# Patient Record
Sex: FEMALE | Race: WHITE | NOT HISPANIC OR LATINO
[De-identification: names, ages, dates, MRNs, and addresses within clinical notes are randomized per-mention and may not be internally consistent; named-entity substitution may affect disease eponyms.]

---

## 2020-12-10 PROBLEM — Z00.00 ENCOUNTER FOR PREVENTIVE HEALTH EXAMINATION: Status: ACTIVE | Noted: 2020-12-10

## 2021-01-15 ENCOUNTER — APPOINTMENT (OUTPATIENT)
Dept: OBGYN | Facility: CLINIC | Age: 36
End: 2021-01-15

## 2021-07-13 ENCOUNTER — NON-APPOINTMENT (OUTPATIENT)
Age: 36
End: 2021-07-13

## 2021-07-13 ENCOUNTER — TRANSCRIPTION ENCOUNTER (OUTPATIENT)
Age: 36
End: 2021-07-13

## 2021-07-14 ENCOUNTER — NON-APPOINTMENT (OUTPATIENT)
Age: 36
End: 2021-07-14

## 2021-07-15 ENCOUNTER — LABORATORY RESULT (OUTPATIENT)
Age: 36
End: 2021-07-15

## 2021-07-15 ENCOUNTER — APPOINTMENT (OUTPATIENT)
Dept: OBGYN | Facility: CLINIC | Age: 36
End: 2021-07-15
Payer: MEDICAID

## 2021-07-15 VITALS
TEMPERATURE: 98.4 F | SYSTOLIC BLOOD PRESSURE: 120 MMHG | WEIGHT: 174 LBS | BODY MASS INDEX: 32.02 KG/M2 | HEIGHT: 62 IN | DIASTOLIC BLOOD PRESSURE: 70 MMHG

## 2021-07-15 DIAGNOSIS — Z01.419 ENCOUNTER FOR GYNECOLOGICAL EXAMINATION (GENERAL) (ROUTINE) W/OUT ABNORMAL FINDINGS: ICD-10-CM

## 2021-07-15 DIAGNOSIS — Z11.3 ENCOUNTER FOR SCREENING FOR INFECTIONS WITH A PREDOMINANTLY SEXUAL MODE OF TRANSMISSION: ICD-10-CM

## 2021-07-15 DIAGNOSIS — E28.2 POLYCYSTIC OVARIAN SYNDROME: ICD-10-CM

## 2021-07-15 PROCEDURE — 36415 COLL VENOUS BLD VENIPUNCTURE: CPT

## 2021-07-15 PROCEDURE — 99385 PREV VISIT NEW AGE 18-39: CPT

## 2021-07-15 NOTE — HISTORY OF PRESENT ILLNESS
[TextBox_4] : 35 year old G0 presents to establish care and for annual exam. Has been diagnosed with PCOS in ~2014 and previously was on OCPs to regulate periods. She went off of OCPs and wants to conceive. She and her fiance have been having unprotected sex for the last year without success, though notes that periods are very irregular. She gets maybe six periods per year. \par She reports a history of cervical dysplasia and underwent cold knife cone biopsy in 2014. Subsequent screenings have been normal. 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

## 2021-07-28 ENCOUNTER — TRANSCRIPTION ENCOUNTER (OUTPATIENT)
Age: 36
End: 2021-07-28

## 2021-07-28 LAB
17OHP SERPL-MCNC: 57 NG/DL
ALBUMIN SERPL ELPH-MCNC: 4.7 G/DL
ALP BLD-CCNC: 87 U/L
ALT SERPL-CCNC: 60 U/L
ANION GAP SERPL CALC-SCNC: 13 MMOL/L
AST SERPL-CCNC: 41 U/L
BILIRUB SERPL-MCNC: 0.4 MG/DL
BUN SERPL-MCNC: 15 MG/DL
C TRACH RRNA SPEC QL NAA+PROBE: NOT DETECTED
CALCIUM SERPL-MCNC: 10.1 MG/DL
CHLORIDE SERPL-SCNC: 99 MMOL/L
CO2 SERPL-SCNC: 24 MMOL/L
CREAT SERPL-MCNC: 0.9 MG/DL
CYTOLOGY CVX/VAG DOC THIN PREP: NORMAL
FSH SERPL-MCNC: 5.8 IU/L
GLUCOSE SERPL-MCNC: 99 MG/DL
HPV HIGH+LOW RISK DNA PNL CVX: NOT DETECTED
LH SERPL-ACNC: 14.4 IU/L
N GONORRHOEA RRNA SPEC QL NAA+PROBE: NOT DETECTED
POTASSIUM SERPL-SCNC: 4.5 MMOL/L
PROT SERPL-MCNC: 7.5 G/DL
SODIUM SERPL-SCNC: 136 MMOL/L
SOURCE TP AMPLIFICATION: NORMAL
T4 FREE SERPL-MCNC: 1 NG/DL
TESTOST BND SERPL-MCNC: 2.9 PG/ML
TESTOSTERONE TOTAL S: 41 NG/DL
TSH SERPL-ACNC: 1.94 UIU/ML

## 2021-07-30 ENCOUNTER — APPOINTMENT (OUTPATIENT)
Dept: OBGYN | Facility: CLINIC | Age: 36
End: 2021-07-30
Payer: MEDICAID

## 2021-07-30 DIAGNOSIS — R74.8 ABNORMAL LEVELS OF OTHER SERUM ENZYMES: ICD-10-CM

## 2021-07-30 PROCEDURE — 99211 OFF/OP EST MAY X REQ PHY/QHP: CPT

## 2021-08-03 LAB
ALBUMIN SERPL ELPH-MCNC: 4.7 G/DL
ALP BLD-CCNC: 83 U/L
ALT SERPL-CCNC: 46 U/L
ANION GAP SERPL CALC-SCNC: 13 MMOL/L
ANION GAP SERPL CALC-SCNC: 16 MMOL/L
AST SERPL-CCNC: 32 U/L
BILIRUB SERPL-MCNC: 0.3 MG/DL
BUN SERPL-MCNC: 11 MG/DL
BUN SERPL-MCNC: 12 MG/DL
CALCIUM SERPL-MCNC: 10.1 MG/DL
CALCIUM SERPL-MCNC: 9.7 MG/DL
CHLORIDE SERPL-SCNC: 100 MMOL/L
CHLORIDE SERPL-SCNC: 102 MMOL/L
CO2 SERPL-SCNC: 21 MMOL/L
CO2 SERPL-SCNC: 25 MMOL/L
CREAT SERPL-MCNC: 0.83 MG/DL
CREAT SERPL-MCNC: 0.85 MG/DL
GLUCOSE SERPL-MCNC: 105 MG/DL
GLUCOSE SERPL-MCNC: 111 MG/DL
POTASSIUM SERPL-SCNC: 4.8 MMOL/L
POTASSIUM SERPL-SCNC: 4.8 MMOL/L
PROT SERPL-MCNC: 7.7 G/DL
SODIUM SERPL-SCNC: 138 MMOL/L
SODIUM SERPL-SCNC: 140 MMOL/L

## 2021-08-10 ENCOUNTER — APPOINTMENT (OUTPATIENT)
Dept: OBGYN | Facility: CLINIC | Age: 36
End: 2021-08-10
Payer: MEDICAID

## 2021-08-10 VITALS
DIASTOLIC BLOOD PRESSURE: 70 MMHG | WEIGHT: 173 LBS | BODY MASS INDEX: 31.83 KG/M2 | HEIGHT: 62 IN | SYSTOLIC BLOOD PRESSURE: 104 MMHG

## 2021-08-10 DIAGNOSIS — N97.9 FEMALE INFERTILITY, UNSPECIFIED: ICD-10-CM

## 2021-08-10 PROCEDURE — 99214 OFFICE O/P EST MOD 30 MIN: CPT

## 2021-08-10 NOTE — HISTORY OF PRESENT ILLNESS
[FreeTextEntry1] : 35 year old G0 presents to f/u fertility evaluation. She has a history of PCOS and is s/p her serologic workup and use of ovulation predictor kit, which confirms anovulation. \par \par She reports that her fiance has not impregnated anyone else, and has not had semen analysis done. \par \par She is not aware of any structural abnormalities to the tubes and uterus, however this has not been evaluated. \par \par Labs from previous visit were reviewed. \par \par Overall she is feeling well but feels like her period is about to come. She has not seen her period since June and feels uncomfortable; would like to provoke her period if possible. \par \par

## 2021-08-10 NOTE — PLAN
[FreeTextEntry1] : We discussed fertility evaluation in detail. The patient will likely need ovulation induction. \par \par She was given information regarding semen analysis and explained its importance. \par \par We also discussed evaluation of structural abnormalities, and rx for TVUS and HSG were provided. \par \par Finally, we discussed ovulation induction with letrozole vs. clomid vs. metformin. After discussion of these options, the patient will opt for letrozole pending the above studies come back normal. We discussed the potential for increasing the dose progressively. Also discussed the option of ovulation induction with IUI under the care of an [facility] specialist; the patient wishes to hold off on specialist referral at this time. \par \par Will rx provera to provoke withdrawal bleed.

## 2021-08-19 LAB — HCG SERPL-MCNC: <1 MIU/ML

## 2022-08-09 ENCOUNTER — APPOINTMENT (OUTPATIENT)
Dept: GASTROENTEROLOGY | Facility: CLINIC | Age: 37
End: 2022-08-09

## 2022-08-09 VITALS
HEART RATE: 85 BPM | TEMPERATURE: 97 F | WEIGHT: 173 LBS | BODY MASS INDEX: 31.83 KG/M2 | HEIGHT: 62 IN | OXYGEN SATURATION: 98 % | SYSTOLIC BLOOD PRESSURE: 120 MMHG | DIASTOLIC BLOOD PRESSURE: 80 MMHG

## 2022-08-09 DIAGNOSIS — K21.9 GASTRO-ESOPHAGEAL REFLUX DISEASE W/OUT ESOPHAGITIS: ICD-10-CM

## 2022-08-09 PROCEDURE — 99203 OFFICE O/P NEW LOW 30 MIN: CPT

## 2022-08-09 RX ORDER — MEDROXYPROGESTERONE ACETATE 10 MG/1
10 TABLET ORAL DAILY
Qty: 10 | Refills: 6 | Status: DISCONTINUED | COMMUNITY
Start: 2021-08-10 | End: 2022-08-09

## 2022-08-09 NOTE — ASSESSMENT
[FreeTextEntry1] : Will start omeprazole 20mg daily.\par \par Can use gaviscon OTC for immediate relief.\par \par No alarm signs or symptoms to necessitate an endoscopic workup at this time.

## 2022-08-09 NOTE — HISTORY OF PRESENT ILLNESS
[FreeTextEntry1] : Ms. Smith is a pleasant 36F h/o PCOS who presents to establish care.\par \par She has had significant heartburn and occasional regurgitation for the past 2 years. She will frequently have heartburn and a sour taste in the back of her mouth, she has had regurgitation with vomiting several times.  No dysphagia, odynophagia.  No weight change prior or during this time.\par \par She has used short courses of OTC omeprazole which have helped after 3 days of use.  Pepcid has not helped. Tums slightly for immediate relief.\par \par No change in bowel habits, was constipated but this improved while on metformin.\par \par Is currently trying to get pregnant, would not want to undergo an EGD or radiologic tests.\par \par Does not smoke, has decreased her EtOH consumption as this has worsened her heartburn.\par \par No FHx of any GI malignancies.

## 2023-03-16 ENCOUNTER — APPOINTMENT (OUTPATIENT)
Dept: GASTROENTEROLOGY | Facility: CLINIC | Age: 38
End: 2023-03-16

## 2023-07-03 ENCOUNTER — RX RENEWAL (OUTPATIENT)
Age: 38
End: 2023-07-03

## 2023-07-03 RX ORDER — OMEPRAZOLE 20 MG/1
20 CAPSULE, DELAYED RELEASE ORAL
Qty: 90 | Refills: 1 | Status: ACTIVE | COMMUNITY
Start: 2022-08-09 | End: 1900-01-01

## 2024-05-21 ENCOUNTER — RESULT REVIEW (OUTPATIENT)
Age: 39
End: 2024-05-21

## 2024-05-23 ENCOUNTER — TRANSCRIPTION ENCOUNTER (OUTPATIENT)
Age: 39
End: 2024-05-23

## 2024-06-03 ENCOUNTER — NON-APPOINTMENT (OUTPATIENT)
Age: 39
End: 2024-06-03

## 2024-06-04 ENCOUNTER — APPOINTMENT (OUTPATIENT)
Dept: SURGERY | Facility: CLINIC | Age: 39
End: 2024-06-04
Payer: COMMERCIAL

## 2024-06-04 VITALS
SYSTOLIC BLOOD PRESSURE: 128 MMHG | OXYGEN SATURATION: 99 % | DIASTOLIC BLOOD PRESSURE: 82 MMHG | BODY MASS INDEX: 33.9 KG/M2 | HEIGHT: 62 IN | WEIGHT: 184.2 LBS | HEART RATE: 81 BPM

## 2024-06-04 DIAGNOSIS — K80.10 CALCULUS OF GALLBLADDER WITH CHRONIC CHOLECYSTITIS W/OUT OBSTRUCTION: ICD-10-CM

## 2024-06-04 DIAGNOSIS — Z98.890 OTHER SPECIFIED POSTPROCEDURAL STATES: ICD-10-CM

## 2024-06-04 PROCEDURE — 99024 POSTOP FOLLOW-UP VISIT: CPT

## 2024-06-04 NOTE — ASSESSMENT
[FreeTextEntry1] : 38-year-old female now status post robotic assisted cholecystectomy for acute cholecystitis and the for trimester pregnancy.  Patient recovering well.  Can return to regular diet as tolerated.  Avoid heavy lifting for another 2 weeks.  Reviewed the potential risk for umbilical/incisional hernia in the setting of recent surgery and current pregnancy.  If she were to develop an incisional/umbilical hernia during pregnancy can reassess after she delivers to discuss next steps for treatment if it is symptomatic.  Follow-up with me as needed.

## 2024-06-04 NOTE — PHYSICAL EXAM
[Abdomen Masses] : No abdominal masses [JVD] : no jugular venous distention  [Respiratory Effort] : normal respiratory effort [No Rash or Lesion] : No rash or lesion [Alert] : alert [Calm] : calm [de-identified] : Robotic incisions clean dry intact healing without issue.  No erythema or drainage. [de-identified] : No acute distress

## 2024-06-04 NOTE — HISTORY OF PRESENT ILLNESS
[FreeTextEntry1] : 38-year-old female returns for follow-up after recent robotic assisted cholecystectomy for acute cholecystitis the first trimester pregnancy.  Patient has followed up with GYN and has a viable fetus still.  Recovering well from surgery.  Incisional pain continues to improve.  Generally eating and drink without issue.  Denies significant diarrhea symptoms.  Denies nausea vomiting or fever.

## 2024-06-05 PROBLEM — K80.10 CALCULUS OF GALLBLADDER WITH CHRONIC CHOLECYSTITIS WITHOUT OBSTRUCTION: Status: RESOLVED | Noted: 2024-06-05 | Resolved: 2024-06-05

## 2024-06-10 ENCOUNTER — TRANSCRIPTION ENCOUNTER (OUTPATIENT)
Age: 39
End: 2024-06-10

## 2024-09-20 ENCOUNTER — APPOINTMENT (OUTPATIENT)
Dept: PEDIATRIC CARDIOLOGY | Facility: CLINIC | Age: 39
End: 2024-09-20

## 2024-09-20 PROCEDURE — 99202 OFFICE O/P NEW SF 15 MIN: CPT

## 2024-09-20 PROCEDURE — 76827 ECHO EXAM OF FETAL HEART: CPT

## 2024-09-20 PROCEDURE — 76820 UMBILICAL ARTERY ECHO: CPT

## 2024-09-20 PROCEDURE — 76825 ECHO EXAM OF FETAL HEART: CPT

## 2024-12-02 ENCOUNTER — TRANSCRIPTION ENCOUNTER (OUTPATIENT)
Age: 39
End: 2024-12-02

## 2024-12-04 ENCOUNTER — TRANSCRIPTION ENCOUNTER (OUTPATIENT)
Age: 39
End: 2024-12-04

## 2024-12-06 ENCOUNTER — TRANSCRIPTION ENCOUNTER (OUTPATIENT)
Age: 39
End: 2024-12-06

## 2024-12-09 ENCOUNTER — RESULT REVIEW (OUTPATIENT)
Age: 39
End: 2024-12-09

## 2024-12-10 ENCOUNTER — TRANSCRIPTION ENCOUNTER (OUTPATIENT)
Age: 39
End: 2024-12-10

## 2024-12-13 ENCOUNTER — TRANSCRIPTION ENCOUNTER (OUTPATIENT)
Age: 39
End: 2024-12-13

## 2024-12-19 ENCOUNTER — TRANSCRIPTION ENCOUNTER (OUTPATIENT)
Age: 39
End: 2024-12-19